# Patient Record
Sex: FEMALE | Race: WHITE | NOT HISPANIC OR LATINO
[De-identification: names, ages, dates, MRNs, and addresses within clinical notes are randomized per-mention and may not be internally consistent; named-entity substitution may affect disease eponyms.]

---

## 2019-06-19 PROBLEM — Z00.00 ENCOUNTER FOR PREVENTIVE HEALTH EXAMINATION: Status: ACTIVE | Noted: 2019-06-19

## 2020-07-06 ENCOUNTER — APPOINTMENT (OUTPATIENT)
Dept: RADIATION ONCOLOGY | Facility: CLINIC | Age: 70
End: 2020-07-06

## 2020-07-07 NOTE — REASON FOR VISIT
[Routine Follow-Up] : routine follow-up visit for [Breast Cancer] : breast cancer [Bone Metastasis] : bone metastasis

## 2020-07-13 ENCOUNTER — APPOINTMENT (OUTPATIENT)
Dept: RADIATION ONCOLOGY | Facility: CLINIC | Age: 70
End: 2020-07-13
Payer: MEDICARE

## 2020-07-13 VITALS
BODY MASS INDEX: 33.31 KG/M2 | SYSTOLIC BLOOD PRESSURE: 144 MMHG | TEMPERATURE: 98.1 F | HEIGHT: 63 IN | WEIGHT: 188 LBS | DIASTOLIC BLOOD PRESSURE: 88 MMHG | HEART RATE: 67 BPM | RESPIRATION RATE: 12 BRPM | OXYGEN SATURATION: 99 %

## 2020-07-13 PROCEDURE — 99213 OFFICE O/P EST LOW 20 MIN: CPT

## 2020-07-13 RX ORDER — APIXABAN 5 MG/1
5 TABLET, FILM COATED ORAL
Refills: 0 | Status: ACTIVE | COMMUNITY

## 2020-07-13 RX ORDER — METOPROLOL TARTRATE 25 MG/1
25 TABLET, FILM COATED ORAL
Refills: 0 | Status: ACTIVE | COMMUNITY

## 2020-07-13 RX ORDER — LEVOTHYROXINE SODIUM 137 UG/1
TABLET ORAL
Refills: 0 | Status: ACTIVE | COMMUNITY

## 2020-07-13 NOTE — REVIEW OF SYSTEMS
[Lethargy: Grade 0] : Lethargy: Grade 0 [Peripheral Motor Neuropathy: Grade 0] : Peripheral Motor Neuropathy: Grade 0 [Peripheral Sensory Neuropathy: Grade 0] : Peripheral Sensory Neuropathy: Grade 0

## 2020-07-13 NOTE — VITALS
[Least Pain Intensity: 4/10] : 4/10 [Maximal Pain Intensity: 4/10] : 4/10 [Pain Description/Quality: ___] : Pain description/quality: [unfilled] [Pain Location: ___] : Pain Location: [unfilled] [Pain Duration: ___] : Pain duration: [unfilled] [80: Normal activity with effort; some signs or symptoms of disease.] : 80: Normal activity with effort; some signs or symptoms of disease.  [OTC] : OTC [90: Minor restrictions in physically strenous activity.] : 90: Minor restrictions in physically strenuous activity. [ECOG Performance Status: 1 - Restricted in physically strenuous activity but ambulatory and able to carry out work of a light or sedentary nature] : Performance Status: 1 - Restricted in physically strenuous activity but ambulatory and able to carry out work of a light or sedentary nature, e.g., light house work, office work

## 2020-07-13 NOTE — PHYSICAL EXAM
[Normal] : oriented to person, place and time, the affect was normal, the mood was normal and not anxious [de-identified] : Subcutaneous fibrosis noted over the lumbar area [de-identified] : No cranial nerve deficits noted no cerebellar signs

## 2020-07-13 NOTE — HISTORY OF PRESENT ILLNESS
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 \par She continues on first-line therapy with Faslodex, Ibrance and Xgeva, which she is tolerating without difficulty. She has been in [place] for the past 6 months. \par \par History of left breast cancer in 2004 status post lumpectomy, chemotherapy, radiation therapy and five years of Arimidex.  She then a diagnosis of right invasive ductal carcinoma breast cancer, initially staged T1c N1 M0.  Shortly thereafter, the patient experienced neurologic compromise of the lower extremities, primarily weakness. A PET/CT scan revealed osteolytic lesions at L4, L5 and S1 with associated epidural soft tissue infiltration. MRI confirmed abnormal marrow signal at L3, L4, L5, S1 S2, with epidural disease at L4.  The patient was admitted, and high dose Decadron was started. Her neurologic symptoms improved significantly, immediately. The course of radiation therapy was started while the patient was still in-house here at [facility]. \par \par DATES OF TREATMENT: 07/29/2019 - 08/09/2019  Lumbar Spine\par NUMBER OF FRACTIONS: 10\par TOTAL DOSE:  3000 cGy\par \par Her last PET scan was in April 23, 2020, which showed:\par -Decrease in avidity of left breast (SUV 2.1, previously 2.9)\par -SubQ stranding along lower back and B/L gluteal regions with mild diffuse avidity (max SUV 2.6) - nonspecific\par -Decrease in avidity of left sacral annabella (SUV 2.9, previously 5.3)\par -Improvement of previously identified ill-defined nodules and RUL consolidation\par \par She met with Dr Octavia Del Castillo June 8, 2020, who planned to continue Faslodex/Ibrance/Xgeva, and will begin PARP inhibitors if progression occurs, given the BRCA2 mutation. She ordered an MRI at that time. \par \par 7/1/20- MRI Lumbar spine- metastatic disease once again noted without significant change from June 16, 20920. Paraspinal epidural soft tissue mass or abscess collection identified. Pathologic fracture not seen. Metastatic deposits are noted in the S 1 and S2 segments of sacrum, L3, L4, L5 vertebral bodies. Signal abnormality is seen adjacent to the vertebral body endplates of T8-T9. \par \par Pt goes every 4 weeks for Faslodex and Xgeva. Ibrance she takes on for 21 days and then off for 7 days, currently taking them and has about 4 left for this round. \par \par Her only complaint is low back pain in the lumbar spine at the site of previous radiation, which has been present for several months. She now has trouble standing. She denies numbness/weakness in lower extremities. She reports she has claustrophobia and requires benzo's for scans. \par \par Pt reports discomfort when she stands up, takes an occasional Tylenol for it. Denies numbness/tingling in her BLE, denies any urinary/bowel issues. Requesting a PT eval for muscle strengthening.

## 2021-01-01 ENCOUNTER — APPOINTMENT (OUTPATIENT)
Dept: PULMONOLOGY | Facility: CLINIC | Age: 71
End: 2021-01-01
Payer: MEDICARE

## 2021-01-01 VITALS
WEIGHT: 165 LBS | OXYGEN SATURATION: 91 % | DIASTOLIC BLOOD PRESSURE: 80 MMHG | SYSTOLIC BLOOD PRESSURE: 110 MMHG | HEIGHT: 63 IN | BODY MASS INDEX: 29.23 KG/M2 | HEART RATE: 94 BPM

## 2021-01-01 DIAGNOSIS — I34.0 NONRHEUMATIC MITRAL (VALVE) INSUFFICIENCY: ICD-10-CM

## 2021-01-01 DIAGNOSIS — G47.19 OTHER HYPERSOMNIA: ICD-10-CM

## 2021-01-01 DIAGNOSIS — G47.33 OBSTRUCTIVE SLEEP APNEA (ADULT) (PEDIATRIC): ICD-10-CM

## 2021-01-01 DIAGNOSIS — Z78.9 OTHER SPECIFIED HEALTH STATUS: ICD-10-CM

## 2021-01-01 DIAGNOSIS — R06.83 SNORING: ICD-10-CM

## 2021-01-01 PROCEDURE — 99204 OFFICE O/P NEW MOD 45 MIN: CPT

## 2021-01-01 RX ORDER — LEVOTHYROXINE SODIUM 0.15 MG/1
150 TABLET ORAL
Qty: 90 | Refills: 0 | Status: DISCONTINUED | COMMUNITY
Start: 2021-01-20

## 2021-01-01 RX ORDER — PALBOCICLIB 125 MG/1
CAPSULE ORAL
Refills: 0 | Status: COMPLETED | COMMUNITY
End: 2021-01-01

## 2021-01-01 RX ORDER — PREDNISONE 20 MG/1
20 TABLET ORAL
Qty: 5 | Refills: 0 | Status: DISCONTINUED | COMMUNITY
Start: 2021-05-10

## 2021-01-01 RX ORDER — SULFAMETHOXAZOLE AND TRIMETHOPRIM 800; 160 MG/1; MG/1
800-160 TABLET ORAL
Qty: 20 | Refills: 0 | Status: DISCONTINUED | COMMUNITY
Start: 2021-02-24

## 2021-01-01 RX ORDER — FUROSEMIDE 40 MG/1
40 TABLET ORAL DAILY
Refills: 0 | Status: ACTIVE | COMMUNITY

## 2021-01-01 RX ORDER — ROSUVASTATIN CALCIUM 5 MG/1
5 TABLET, FILM COATED ORAL
Refills: 0 | Status: COMPLETED | COMMUNITY
End: 2021-01-01

## 2021-01-01 RX ORDER — CYCLOBENZAPRINE HYDROCHLORIDE 5 MG/1
5 TABLET, FILM COATED ORAL
Qty: 21 | Refills: 0 | Status: DISCONTINUED | COMMUNITY
Start: 2021-05-06

## 2021-01-01 RX ORDER — METOPROLOL SUCCINATE 50 MG/1
50 TABLET, EXTENDED RELEASE ORAL
Qty: 90 | Refills: 0 | Status: DISCONTINUED | COMMUNITY
Start: 2021-01-01

## 2021-01-01 RX ORDER — BUPROPION HYDROCHLORIDE 75 MG/1
75 TABLET, FILM COATED ORAL
Qty: 10 | Refills: 0 | Status: DISCONTINUED | COMMUNITY
Start: 2021-03-01

## 2021-01-01 RX ORDER — PROPAFENONE 225 MG/1
225 CAPSULE, EXTENDED RELEASE ORAL
Refills: 0 | Status: ACTIVE | COMMUNITY

## 2021-01-01 RX ORDER — FLUOROURACIL 50 MG/G
5 CREAM TOPICAL
Qty: 40 | Refills: 0 | Status: DISCONTINUED | COMMUNITY
Start: 2021-01-21

## 2021-01-01 RX ORDER — PROPAFENONE HYDROCHLORIDE 225 MG/1
225 TABLET, FILM COATED ORAL
Qty: 60 | Refills: 0 | Status: DISCONTINUED | COMMUNITY
Start: 2021-01-01

## 2021-01-01 RX ORDER — FULVESTRANT 50 MG/ML
250 INJECTION INTRAMUSCULAR
Refills: 0 | Status: COMPLETED | COMMUNITY
End: 2021-01-01

## 2021-01-01 RX ORDER — DENOSUMAB 120 MG/1.7ML
120 INJECTION SUBCUTANEOUS
Refills: 0 | Status: COMPLETED | COMMUNITY
End: 2021-01-01

## 2021-01-01 RX ORDER — LEVOTHYROXINE SODIUM 0.17 MG/1
175 TABLET ORAL
Refills: 0 | Status: ACTIVE | COMMUNITY

## 2021-01-01 RX ORDER — BUPROPION HYDROCHLORIDE 150 MG/1
150 TABLET, EXTENDED RELEASE ORAL
Qty: 90 | Refills: 0 | Status: DISCONTINUED | COMMUNITY
Start: 2020-12-18

## 2021-01-01 RX ORDER — PALBOCICLIB 125 MG/1
125 CAPSULE ORAL
Refills: 0 | Status: ACTIVE | COMMUNITY

## 2021-01-01 RX ORDER — CLINDAMYCIN PHOSPHATE 20 MG/G
2 CREAM VAGINAL
Qty: 40 | Refills: 0 | Status: DISCONTINUED | COMMUNITY
Start: 2021-02-05

## 2021-01-01 RX ORDER — PHENAZOPYRIDINE HYDROCHLORIDE 200 MG/1
200 TABLET ORAL
Qty: 6 | Refills: 0 | Status: DISCONTINUED | COMMUNITY
Start: 2021-02-16

## 2021-01-01 RX ORDER — OMEPRAZOLE 20 MG/1
20 CAPSULE, DELAYED RELEASE ORAL
Qty: 90 | Refills: 0 | Status: DISCONTINUED | COMMUNITY
Start: 2021-01-01

## 2021-01-01 RX ORDER — OMEPRAZOLE 20 MG/1
20 TABLET, DELAYED RELEASE ORAL
Refills: 0 | Status: ACTIVE | COMMUNITY

## 2021-01-01 RX ORDER — MONTELUKAST 10 MG/1
10 TABLET, FILM COATED ORAL
Qty: 90 | Refills: 0 | Status: DISCONTINUED | COMMUNITY
Start: 2021-03-01

## 2021-01-01 RX ORDER — FLUCONAZOLE 150 MG/1
150 TABLET ORAL
Qty: 1 | Refills: 0 | Status: DISCONTINUED | COMMUNITY
Start: 2021-02-21

## 2021-01-01 RX ORDER — METFORMIN HYDROCHLORIDE 500 MG/1
500 TABLET, COATED ORAL
Refills: 0 | Status: ACTIVE | COMMUNITY

## 2021-01-01 RX ORDER — IPRATROPIUM BROMIDE 42 UG/1
0.06 SPRAY NASAL
Qty: 15 | Refills: 0 | Status: DISCONTINUED | COMMUNITY
Start: 2021-05-24

## 2021-01-01 RX ORDER — ROSUVASTATIN CALCIUM 40 MG/1
40 TABLET, FILM COATED ORAL
Refills: 0 | Status: ACTIVE | COMMUNITY

## 2021-07-02 ENCOUNTER — APPOINTMENT (OUTPATIENT)
Dept: CARE COORDINATION | Facility: HOME HEALTH | Age: 71
End: 2021-07-02
Payer: MEDICARE

## 2021-07-02 VITALS
SYSTOLIC BLOOD PRESSURE: 122 MMHG | HEART RATE: 94 BPM | DIASTOLIC BLOOD PRESSURE: 78 MMHG | OXYGEN SATURATION: 97 % | RESPIRATION RATE: 16 BRPM

## 2021-07-02 DIAGNOSIS — I50.9 HEART FAILURE, UNSPECIFIED: ICD-10-CM

## 2021-07-02 DIAGNOSIS — E78.5 HYPERLIPIDEMIA, UNSPECIFIED: ICD-10-CM

## 2021-07-02 DIAGNOSIS — Z87.891 PERSONAL HISTORY OF NICOTINE DEPENDENCE: ICD-10-CM

## 2021-07-02 DIAGNOSIS — I10 ESSENTIAL (PRIMARY) HYPERTENSION: ICD-10-CM

## 2021-07-02 DIAGNOSIS — C79.51 SECONDARY MALIGNANT NEOPLASM OF BONE: ICD-10-CM

## 2021-07-02 DIAGNOSIS — C80.1 SECONDARY MALIGNANT NEOPLASM OF BONE: ICD-10-CM

## 2021-07-02 DIAGNOSIS — C50.919 MALIGNANT NEOPLASM OF UNSPECIFIED SITE OF UNSPECIFIED FEMALE BREAST: ICD-10-CM

## 2021-07-02 DIAGNOSIS — I27.20 PULMONARY HYPERTENSION, UNSPECIFIED: ICD-10-CM

## 2021-07-02 DIAGNOSIS — I48.91 UNSPECIFIED ATRIAL FIBRILLATION: ICD-10-CM

## 2021-07-02 DIAGNOSIS — Z80.3 FAMILY HISTORY OF MALIGNANT NEOPLASM OF BREAST: ICD-10-CM

## 2021-07-02 DIAGNOSIS — Z92.3 PERSONAL HISTORY OF IRRADIATION: ICD-10-CM

## 2021-07-02 DIAGNOSIS — E03.9 HYPOTHYROIDISM, UNSPECIFIED: ICD-10-CM

## 2021-07-02 PROCEDURE — 99496 TRANSJ CARE MGMT HIGH F2F 7D: CPT

## 2021-07-05 PROBLEM — Z87.891 FORMER SMOKER: Status: ACTIVE | Noted: 2020-07-13

## 2021-07-05 PROBLEM — I10 HYPERTENSION: Status: ACTIVE | Noted: 2021-01-01

## 2021-07-05 PROBLEM — I50.9 CHF (CONGESTIVE HEART FAILURE): Status: ACTIVE | Noted: 2021-01-01

## 2021-07-05 PROBLEM — C50.919 METASTATIC BREAST CANCER: Status: ACTIVE | Noted: 2020-07-13

## 2021-07-05 PROBLEM — C79.51 METASTASIS TO BONE OF UNKNOWN PRIMARY: Status: ACTIVE | Noted: 2020-07-13

## 2021-07-05 PROBLEM — Z80.3 FAMILY HISTORY OF MALIGNANT NEOPLASM OF BREAST: Status: ACTIVE | Noted: 2020-07-13

## 2021-07-05 PROBLEM — E03.9 HYPOTHYROIDISM: Status: ACTIVE | Noted: 2021-01-01

## 2021-07-05 PROBLEM — I48.91 ATRIAL FIBRILLATION: Status: ACTIVE | Noted: 2021-01-01

## 2021-07-05 PROBLEM — E78.5 HYPERLIPIDEMIA: Status: ACTIVE | Noted: 2021-01-01

## 2021-07-05 PROBLEM — I27.20 PULMONARY HYPERTENSION: Status: ACTIVE | Noted: 2021-01-01

## 2021-07-05 NOTE — HISTORY OF PRESENT ILLNESS
[FreeTextEntry1] : Follow-up for discharge from [facility] for heart failure and new pulmonary hypertension.\par  [de-identified] : Patient is a 71 year y/o female enrolled in the [facility] program with a history of p A fib on a/c, Left breast cancer s/p lumpectomy, chemo, radiation, now with right breast cancer on Ibrance/fulvestrant, hypothyroidism, HLD, HTN recently admitted from 6/25/21-6/29/21 to [facility] for heart failure and pulmonary hypertension.  Patient presented to the ER for evaluation of hypoxia, and exertional shortness of breath for several days.  Patient treated with diuretics and echo with significant pulmonary HTN, and mod to severe MR.  CT scan and VQ scan without PE.  Patient's ibrance placed on hold.  Patient's hypoxia improved, and back to baseline wearing 2 liters oxygen NC.  Patient medically optimized and stable for discharge with close follow-up with pulmonary.\par \par Patient contact within 48 hours, discharge reviewed.  Discharge medications were reviewed and reconciled with the current medication list and medications in home.\par \par Patient evaluated in home and on arrival patient sitting comfortably on couch and is alert and oriented x3, and in no acute distress.  Patient has oxygen in place.  Patient states her breathing has improved, and has the lasix that she was ordered at discharge.  Patient does not weight herself currently, but has a low sodium diet.  Patient states she is able to ambulate around independently. Patient denies chest pain, palpitations, shortness of breath, abdominal pain, nausea, vomiting, diarrhea, lightheaded or dizziness.\par \par

## 2021-07-05 NOTE — REVIEW OF SYSTEMS
[Fever] : no fever [Chills] : no chills [Fatigue] : no fatigue [Chest Pain] : no chest pain [Palpitations] : no palpitations [Lower Ext Edema] : no lower extremity edema [Abdominal Pain] : no abdominal pain [Nausea] : no nausea [Constipation] : no constipation [Diarrhea] : diarrhea [Vomiting] : no vomiting [Negative] : Neurological

## 2021-07-05 NOTE — PLAN
[FreeTextEntry1] : 1) CHF\par - Continue lasix daily\par - Educated on the need for daily weights.\par - Advised to call for an increase of greater than 2 lbs in a day or 5 lbs in a week.\par - Adhere to a low salt diet: educated on foods that should be avoided such as processed or fast foods.\par - Limit fluids to 1.5 liters per day.\par - Follow-up with cardiologist\par - Contact information given and patient advised to call with any questions or concerns.\par \par 2) Pulmonary Hypertension\par - Echo with significant pulmonary HTN, unknown cause\par - Follow-up with Dr. Greenwood (pulmonary) on 7/6 as scheduled\par \par 3) A.fib - stable\par - Continue on Eliquis \par - Continue propafenone and Metoprolol\par \par 4) Hypothyroid - Stable\par - Continue synthroid\par \par 5) Breat CA\par - Continue chemo as per Dr. Del Castillo\par - Continue to hold Ibrance\par - Follow-up with Dr. Del Castillo (Onc) as scheduled\par \par 6) Hypertension - Stable\par - Continue Metoprolol\par \par 7) Hyperlipidemia\par - Continue Crestor

## 2021-07-05 NOTE — ASSESSMENT
[FreeTextEntry1] : Patient is a 71 year y/o female enrolled in the [facility] program with a history of p A fib on a/c, Left breast cancer s/p lumpectomy, chemo, radiation, now with right breast cancer on Ibrance/fulvestrant, hypothyroidism, HLD, HTN recently admitted from 6/25/21-6/29/21 to [facility] for heart failure and pulmonary hypertension.  Patient presented to the ER for evaluation of hypoxia, and exertional shortness of breath for several days.  Patient treated with diuretics and echo with significant pulmonary HTN, and mod to severe MR.  CT scan and VQ scan without PE.  Patient's ibrance placed on hold.  Patient's hypoxia improved, and back to baseline wearing 2 liters oxygen NC.  Patient medically optimized and stable for discharge with close follow-up with pulmonary.\par \par Patient contact within 48 hours, discharge reviewed.  Discharge medications were reviewed and reconciled with the current medication list and medications in home.\par \par Patient evaluated in home and on arrival patient sitting comfortably on couch and is alert and oriented x3, and in no acute distress.  Patient has oxygen in place.  Patient states her breathing has improved, and has the lasix that she was ordered at discharge.  Patient does not weight herself currently, but has a low sodium diet.  Patient states she is able to ambulate around independently. Patient denies chest pain, palpitations, shortness of breath, abdominal pain, nausea, vomiting, diarrhea, lightheaded or dizziness.\par

## 2021-07-05 NOTE — PHYSICAL EXAM
[No Acute Distress] : no acute distress [Well Nourished] : well nourished [Well Developed] : well developed [Well-Appearing] : well-appearing [Normal Sclera/Conjunctiva] : normal sclera/conjunctiva [PERRL] : pupils equal round and reactive to light [Normal Outer Ear/Nose] : the outer ears and nose were normal in appearance [Supple] : supple [No Respiratory Distress] : no respiratory distress  [No Accessory Muscle Use] : no accessory muscle use [Clear to Auscultation] : lungs were clear to auscultation bilaterally [Normal Rate] : normal rate  [Regular Rhythm] : with a regular rhythm [Normal S1, S2] : normal S1 and S2 [No Edema] : there was no peripheral edema [Soft] : abdomen soft [Non Tender] : non-tender [Non-distended] : non-distended [Normal Bowel Sounds] : normal bowel sounds [No Rash] : no rash [No Focal Deficits] : no focal deficits [Memory Grossly Normal] : memory grossly normal [Normal Affect] : the affect was normal [Normal Mood] : the mood was normal [Normal Insight/Judgement] : insight and judgment were intact

## 2021-07-19 PROBLEM — G47.19 EXCESSIVE DAYTIME SLEEPINESS: Status: ACTIVE | Noted: 2021-01-01

## 2021-07-19 PROBLEM — I34.0 MITRAL VALVULAR REGURGITATION: Status: ACTIVE | Noted: 2021-01-01

## 2021-07-19 PROBLEM — R06.83 SNORING: Status: ACTIVE | Noted: 2021-01-01

## 2021-07-19 PROBLEM — Z78.9 ALCOHOL INGESTION: Status: ACTIVE | Noted: 2020-07-13

## 2021-07-19 NOTE — ASSESSMENT
[FreeTextEntry1] : 71-year-old woman with history of atrial fibrillation, pulmonary hypertension and moderate to severe MR.  Patient has symptoms suggestive of sleep apnea and also has home oxygen.\par \par Will do a sleep study to assess the severity of sleep apnea followed by titration study.

## 2021-07-19 NOTE — REASON FOR VISIT
[Initial Evaluation] : an initial evaluation [Sleep Apnea] : sleep apnea [FreeTextEntry2] : parasomnia

## 2021-07-19 NOTE — PHYSICAL EXAM
[General Appearance - Well Developed] : well developed [General Appearance - Well Nourished] : well nourished [IV] : IV [Heart Sounds] : normal S1 and S2 [] : no respiratory distress [Auscultation Breath Sounds / Voice Sounds] : lungs were clear to auscultation bilaterally [No Focal Deficits] : no focal deficits [Oriented To Time, Place, And Person] : oriented to person, place, and time [Memory Recent] : recent memory was not impaired [FreeTextEntry1] : no edema

## 2021-08-03 PROBLEM — G47.33 OBSTRUCTIVE SLEEP APNEA, ADULT: Status: ACTIVE | Noted: 2021-01-01

## 2022-01-01 ENCOUNTER — TRANSCRIPTION ENCOUNTER (OUTPATIENT)
Age: 72
End: 2022-01-01

## 2023-10-01 PROBLEM — Z92.3 HISTORY OF RADIATION THERAPY: Status: RESOLVED | Noted: 2020-07-13 | Resolved: 2023-10-01
